# Patient Record
Sex: FEMALE | Race: WHITE
[De-identification: names, ages, dates, MRNs, and addresses within clinical notes are randomized per-mention and may not be internally consistent; named-entity substitution may affect disease eponyms.]

---

## 2020-04-07 ENCOUNTER — HOSPITAL ENCOUNTER (OUTPATIENT)
Dept: HOSPITAL 47 - WWCWWP | Age: 65
Discharge: HOME | End: 2020-04-07
Attending: SURGERY
Payer: COMMERCIAL

## 2020-04-07 VITALS
SYSTOLIC BLOOD PRESSURE: 127 MMHG | DIASTOLIC BLOOD PRESSURE: 81 MMHG | RESPIRATION RATE: 18 BRPM | HEART RATE: 96 BPM | TEMPERATURE: 97.6 F

## 2020-04-07 DIAGNOSIS — Z53.9: Primary | ICD-10-CM

## 2020-04-07 NOTE — P.GSHP
History of Present Illness


H&P Date: 20


Chief Complaint: Abnormal radiograph left breast





     Alisa is a 64-year-old white female who had a routine mamm[place] 

on [address].  This revealed a 7mm left upper outer quadrant lession the patient 

was recommended to have additional imaging performed.  No lesions of concern 

were noted in the right breast.  On [address] she had additional workup which 

revealed a spiculated 1 x 0.9 cm lesion in the left upper outer quadrant 4 cm 

from the nipple.  An ultrasound was performed on the same day which revealed the

same lesion which was considered suspicious and biopsy was recommended.  The 

patietn does not feel anything of concern in her breast. The patients last mamm

ogram was a year ago and she states it was nonworrisome.





Family History:


mother: bilateral breast cancer, cervical cancer, Burkits lymphoma


maternal aunt: breast cancer








Hormonal History:


menarche: 13


, 1 misscarrage, first born at 23, breast fed: no


menoapuse: 51


BCP: 10 years


hormones: none 





Surgical History:


1. tubal ligation








Medical History: 


none





Social History:


smoke: 1 1/2 PPD since 17


alcohol: twice a week/4 beers


drugs: none


























- Constitutional


Constitutional: Reports night sweats





- EENT


Eyes: denies blurred vision, denies pain


Ears: deny: decreased hearing, tinnitus


Ears, nose, mouth and throat: Denies headache, Denies sore throat





- Breasts


Breasts: bilateral: as per HPI





- Cardiovascular


Cardiovascular: Denies chest pain, Denies shortness of breath





- Respiratory


Comment: 





smoker/ bronchitis 


Respiratory: Denies cough, Denies 7





- Gastrointestinal


Gastrointestinal: Denies abdominal pain, Denies diarrhea, Denies nausea, Denies 

vomiting





- Genitourinary (Female)


Genitourinary: Denies dysuria, Denies hematuria





- Menstruation


Menstruation: Reports postmenopausal





- Musculoskeletal


Musculoskeletal: Denies myalgias





- Integumentary


Integumentary: Denies pruritus, Denies rash





- Neurological


Neurological: Denies numbness, Denies weakness





- Psychiatric


Psychiatric: Reports anxiety, Denies depression





- Endocrine


Endocrine: Denies fatigue, Denies weight change





- Hematologic/Lymphatic


Comment: 





none





- Allergic/Immunologic


Comment: 





none


Allergic/Immunologic: Reports as per HPI





Past Medical History


History of Any Multi-Drug Resistant Organisms: None Reported


Smoking Status: Current every day smoker





Medications and Allergies


                                Home Medications











 Medication  Instructions  Recorded  Confirmed  Type


 


ALPRAZolam [Xanax] 0.5 mg PO BID PRN 20 History








                                    Allergies











Allergy/AdvReac Type Severity Reaction Status Date / Time


 


No Known Allergies Allergy   Verified 20 09:50














Surgical - Exam


                                   Vital Signs











Temp Pulse Resp BP Pulse Ox


 


 97.6 F   96   18   127/81   97 


 


 20 09:46  20 09:46  20 09:46  20 09:46  20 09:46














BMI 26.4





- General


well developed, well nourished, no distress





- Eyes


normal ocular movement





- ENT


no hearing loss, no congestion





- Neck


no masses, trachea midline





- Respiratory


normal respiratory effort, clear to auscultation





- Cardiovascular


Rhythm: regular


Heart Sounds: normal: S1, S2





- Abdomen


Abdomen: soft, non tender, no guarding, no rigid, no rebound





- Integumentary





normal turgor





- Neurologic


no disoriented, no combative





- Musculoskeletal


normal gait





- Psychiatric


oriented to time, oriented to person, oriented to place, speech is normal, 

memory intact





breast exam:





Bra: 40B





inspection: no nipple inversion or lesions of concern





Palpation:





right breast:  Multi-positional exam fibrocystic changes, no dominant masses or 

nodules of concern


Right axilla: No adenopathy of concern


left breast:  Multiple positional exam increased fullness in the upper outer 

quadrant.


Left axilla: No adenopathy of concern








Results





Abnormal mammogram left breast, abnormal ultrasound left breast





Assessment and Plan


Assessment: 





Impression:


1.  Abnormal left breast mammogram


2.  Abnormal left breast ultrasound


3.  Palpable mass/fullness left breast


4.  History of tobacco use/bronchitis


5.  Anxiety





Plan:


1.  Ultrasound-guided core biopsy left breast


2.  Follow up after results of ultrasound core biopsy available





Risks and benefits of procedure discussed with the patient.  She understands and

 wishes to proceed.  Risks include but are not limited to bleeding, infection, 

reaction to the anesthetic.  The patient is going to be called next week with 

results of the biopsy.  Secondary to the concerns regarding  corona virus the 

appointment.  She understands that because this is BIRADS 5 if the diagnosis is 

not a cancer she will be scheduled for needle local excisional biopsy of the 

area of concern.  If this is a cancer it appears that there is a T1 lesion and 

she will be scheduled for a teleconference with medical oncology/surgery as soon

 as safe to do so secondary to the coronal virus situation.





CC:


Dr. Prabhu Pat





Encounter 45 minutes, > 50% of time in planning and counselling. 





Time with Patient: Greater than 30

## 2020-04-08 ENCOUNTER — HOSPITAL ENCOUNTER (OUTPATIENT)
Dept: HOSPITAL 47 - RADUSWWP | Age: 65
End: 2020-04-08
Attending: SURGERY
Payer: COMMERCIAL

## 2020-04-08 VITALS — DIASTOLIC BLOOD PRESSURE: 84 MMHG | HEART RATE: 80 BPM | SYSTOLIC BLOOD PRESSURE: 127 MMHG

## 2020-04-08 VITALS — TEMPERATURE: 98 F | RESPIRATION RATE: 16 BRPM

## 2020-04-08 DIAGNOSIS — Z17.0: ICD-10-CM

## 2020-04-08 DIAGNOSIS — C50.912: Primary | ICD-10-CM

## 2020-04-08 PROCEDURE — 77065 DX MAMMO INCL CAD UNI: CPT

## 2020-04-08 PROCEDURE — 88305 TISSUE EXAM BY PATHOLOGIST: CPT

## 2020-04-08 PROCEDURE — 88341 IMHCHEM/IMCYTCHM EA ADD ANTB: CPT

## 2020-04-08 PROCEDURE — 19083 BX BREAST 1ST LESION US IMAG: CPT

## 2020-04-08 PROCEDURE — 88342 IMHCHEM/IMCYTCHM 1ST ANTB: CPT

## 2020-04-08 NOTE — USB
EXAMINATION TYPE: US biopsy breast VAD LT, MG diagnostic mammo LT wo CAD

 

DATE OF EXAM: 4/8/2020

 

CLINICAL HISTORY: R92.8 Abnormal Mammogram. Abnormal ultrasound.

 

TECHNIQUE: Ultrasound guided core biopsy of left breast with clip placement and 
follow-up diagnostic left breast mammogram.  

 

COMPARISON: Mammogram and ultrasound March 20, 2020 and older studies.

 

FINDINGS: The procedure of ultrasound guided core biopsy was explained to the 
patient.  Benefits, alternatives, and risks were discussed.  An informed consent
was then obtained.  

 

The patient was placed in supine positioning for  imaging and for the procedure.
The overlying skin was prepped and draped in usual sterile fashion. Lidocaine 
was used as anesthetic into the skin and subcutaneous tissue up to area of 
concern in the left breast. Lidocaine with epinephrine is used as anesthetic in 
the deeper tissue.

 

Under ultrasound guidance, a vacuum assisted biopsy gun device was used to 
obtain 4 core samples.  Following this, a biopsy clip was left in lesion.  

 

The patient tolerated the procedure well without any immediate complication.  
The patient was kept in the radiology department for short stay after the 
procedure and then discharged home in stable condition.  

 

Post procedure mammogram shows successful deployment of clip corresponding to 
area of concern on mammogram anterior depth upper outer aspect spiculated area.

 

IMPRESSION: Successful, uncomplicated ultrasound guided core biopsy of area of 
concern in the left breast, full pathology results to follow. 

 

Intermediate to high index of suspicion noted at time of procedure.

 

Pathology Results: Malignant

 

LEFT BREAST LESION AT ONE O'CLOCK, NEEDLE CORE BIOPSY:   Infiltrating moderately
differentiated (Cecelia Grade 2) ductal carcinoma of the breast in a 
background of intermediate grade DCIS with focal necrosis.  See Surgical 
Pathology Cancer Case Summary.



In order to differentiate the lesion as ductal adenocarcinoma,  an appropriately
controlled E-Cadherin stain is examined. The tumor is E-Cadherin postive, 
confirming ductal differentiation. 



Recommendation

Surgical consult of the left breast.

CAL[facility]

## 2020-04-21 ENCOUNTER — HOSPITAL ENCOUNTER (OUTPATIENT)
Dept: HOSPITAL 47 - WWCWWP | Age: 65
Discharge: HOME | End: 2020-04-21
Attending: SURGERY
Payer: COMMERCIAL

## 2020-04-21 DIAGNOSIS — Z53.9: Primary | ICD-10-CM

## 2020-04-21 NOTE — P.PN
Progress Note - Text


Progress Note Date: 04/21/20





teleconference regarding core breast biopsy results, stage 1A left breast cancer





Alisa is a 64 -year-old white female who on a routine mammogram was noted to 

have an area of concern in the left breast.  Core biopsy revealed a grade 2 T1 

N0 ER positive PA negative G2 HER-2 negative stage IA invasive ductal carcinoma 

with ductal carcinoma in situ.  The patient post procedure does not have any 

complaints related to the procedure.  I have discussed the surgical options with

the patient which range from lumpectomy sentinel node biopsy to possible 

axillary node dissection, or mastectomy.  The patient has a positive family 

history of breast cancer in her mother having had bilateral breast cancers and a

maternal aunt with breast cancer.  She is requesting genetic testing.  





     The patient has a present appointment with medical oncology May 7.  In the 

pericolic area she would be considered a good candidate for surgical 

intervention.  However in the colon with pan pandemic area she is considered an 

[employer] B1 patient and based on her resources she may 

undergo neoadjuvant chemotherapy.  Additionally if it's possible to get an 

Oncotype test performed she may benefit from some neoadjuvant chemotherapy 

depending on the results.





     Her case will be presented at tumor Board.





Impression/Plan:


1.  Stage I a left breast cancer


2.  Family history of breast cancer


3.  [employer] priority B1, consider neoadjuvant 

hormonal therapy and/or neoadjuvant chemotherapy depending on Oncotype DX score


4.  Presentation at tumor board


5.  Appointment here following restrictions being listed in our clinic to see 

patient secondary to the cold pandemic





CC: Dr. Prabhu Paniagua





encounter 45 minutes

## 2020-05-14 ENCOUNTER — HOSPITAL ENCOUNTER (OUTPATIENT)
Dept: HOSPITAL 47 - WWCWWP | Age: 65
Discharge: HOME | End: 2020-05-14
Attending: SURGERY
Payer: COMMERCIAL

## 2020-05-14 VITALS
DIASTOLIC BLOOD PRESSURE: 94 MMHG | HEART RATE: 86 BPM | RESPIRATION RATE: 18 BRPM | SYSTOLIC BLOOD PRESSURE: 135 MMHG | TEMPERATURE: 97.7 F

## 2020-05-14 DIAGNOSIS — Z53.9: Primary | ICD-10-CM

## 2020-05-14 NOTE — P.PN
Subjective


Progress Note Date: 20


Principal diagnosis: 





left breast cancer  Stage IB


 Alisa is a 64-year-old white female who had a [place] mammogram [place] on 

[address].  This revealed a 7mm left upper outer quadrant lession the patient was 

recommended to have additional imaging performed.  No lesions of concern were 

noted in the right breast.  On [address] she had additional workup which revealed 

a spiculated 1 x 0.9 cm lesion in the left upper outer quadrant 4 cm from the 

nipple.  An ultrasound was performed on the same day which revealed the same 

lesion which was considered suspicious and biopsy was recommended.  The patient 

does not feel anything of concern in her breast. The patients last mammogram pr

ior to this was a year ago and she states it was nonworrisome.


     She had a core biopsy done on 20, this revealed infiltrating moderately

differentiated ductal carcinoma of the left breast.  This was ER positive LA 

negative grade 2 HER-2 negative.  It is a stage IA.  She was  subsequently seen 

by medical oncology and her case was presented at tumor board.  It was felt that

we should proceed with surgical intervention first and further recommendation to

follow this. She did well with the biopsy.

















Family History:


mother: bilateral breast cancer, cervical cancer, Burkits lymphoma


maternal aunt: breast cancer








Hormonal History:


menarche: 13


, 1 misscarrage, first born at 23, breast fed: no


menoapuse: 51


BCP: 10 years


hormones: none 





Surgical History:


1. tubal ligation








Medical History: 


none





Social History:


smoke: 1 1/2 PPD since 17


alcohol: twice a week/4 beers


drugs: none


























- Constitutional


Constitutional: Reports night sweats





- EENT


Eyes: denies blurred vision, denies pain


Ears: deny: decreased hearing, tinnitus


Ears, nose, mouth and throat: Denies headache, Denies sore throat





- Breasts


Breasts: bilateral: as per HPI





- Cardiovascular


Cardiovascular: Denies chest pain, Denies shortness of breath





- Respiratory


Comment: 





smoker/ bronchitis 


Respiratory: Denies cough, Denies 7





- Gastrointestinal


Gastrointestinal: Denies abdominal pain, Denies diarrhea, Denies nausea, Denies 

vomiting





- Genitourinary (Female)


Genitourinary: Denies dysuria, Denies hematuria





- Menstruation


Menstruation: Reports postmenopausal





- Musculoskeletal


Musculoskeletal: Denies myalgias





- Integumentary


Integumentary: Denies pruritus, Denies rash





- Neurological


Neurological: Denies numbness, Denies weakness





- Psychiatric


Psychiatric: Reports anxiety, Denies depression





- Endocrine


Endocrine: Denies fatigue, Denies weight change





- Hematologic/Lymphatic


Comment: 





none





- Allergic/Immunologic


Comment: 





none


Allergic/Immunologic: Reports as per HPI





Past Medical History


History of Any Multi-Drug Resistant Organisms: None Reported


Smoking Status: Current every day smoker





Medications and Allergies


                                Home Medications











 Medication  Instructions  Recorded  Confirmed  Type


 


ALPRAZolam [Xanax] 0.5 mg PO BID PRN 20 History








                                    Allergies











Allergy/AdvReac Type Severity Reaction Status Date / Time


 


No Known Allergies Allergy   Verified 20 09:50

















Objective





- Vital Signs


Vital signs: 


                                   Vital Signs











Temp  97.7 F   20 10:25


 


Pulse  86   20 10:25


 


Resp  18   20 10:25


 


BP  135/94   20 10:25


 


Pulse Ox  98   20 10:25








                                 Intake & Output











 20





 18:59 06:59 18:59


 


Weight   70.76 kg














- Exam





BMI 27.6





- Constitutional


General appearance: Present: average body habitus





- EENT


Eyes: Present: EOMI


ENT: Present: hearing grossly normal





- Respiratory


Respiratory: bilateral: CTA





- Cardiovascular


Rhythm: regular


Heart sounds: normal: S1, S2





- Integumentary


Integumentary Comment(s): 





no infection or hematoma at biopsy site


Integumentary: Present: normal turgor





- Musculoskeletal


Musculoskeletal: Present: gait normal





- Psychiatric


Psychiatric: Present: A&O x's 3, appropriate affect, intact judgment & insight





- Additional findings


Additional findings: 





left breast site no evidence of infection or hematoma at site





left breast slightly larger than right breast


ptosis grade 3 bilateral








Assessment and Plan


Assessment: 





Impression:





1. left breast stage 1B


2. nicotine dependance








Plan:


1.  Left breast needle localization lumpectomy, probable bronchoplasty tissue 

transfer, sentinel node injection, sentinel node biopsy, possible axillary node 

dissection, probable to be done via a mastopexy incision


2.  Patient to attempt to stop smoking between now and then


3.  Medical clearance from Dr. Clemons








Risks and benefits of the procedure been discussed with the patient.  These 

include but are not limited to bleeding, infection, possible reaction to the 

anesthetic.  She understands that if the margins were to be positive she will 

need to have additional surgery.  She also understands there is a risk of not 

getting the correct spot at the time of lumpectomy.  She understands that she 

could have a mastectomy plus or minus reconstruction but has opted for a lump

ectomy.  She understands the risks of sentinel node biopsy.  She also 

understands the risk of flow axillary node dissection.





Cc: Dr. Clemons





encounter 40 minutes greater than 50% of time on planning and counselling





Time with Patient: Greater than 30

## 2020-05-29 ENCOUNTER — HOSPITAL ENCOUNTER (OUTPATIENT)
Dept: HOSPITAL 47 - LABWHC1 | Age: 65
Discharge: HOME | End: 2020-05-29
Attending: SURGERY
Payer: COMMERCIAL

## 2020-05-29 DIAGNOSIS — Z11.59: Primary | ICD-10-CM

## 2020-06-02 ENCOUNTER — HOSPITAL ENCOUNTER (OUTPATIENT)
Dept: HOSPITAL 47 - OR | Age: 65
Discharge: HOME | End: 2020-06-02
Attending: SURGERY
Payer: COMMERCIAL

## 2020-06-02 VITALS — DIASTOLIC BLOOD PRESSURE: 85 MMHG | SYSTOLIC BLOOD PRESSURE: 144 MMHG | HEART RATE: 100 BPM

## 2020-06-02 VITALS — RESPIRATION RATE: 16 BRPM

## 2020-06-02 VITALS — BODY MASS INDEX: 26.5 KG/M2

## 2020-06-02 VITALS — TEMPERATURE: 97.3 F

## 2020-06-02 DIAGNOSIS — Z81.8: ICD-10-CM

## 2020-06-02 DIAGNOSIS — Z80.7: ICD-10-CM

## 2020-06-02 DIAGNOSIS — N60.12: ICD-10-CM

## 2020-06-02 DIAGNOSIS — Z80.49: ICD-10-CM

## 2020-06-02 DIAGNOSIS — Z17.0: ICD-10-CM

## 2020-06-02 DIAGNOSIS — F41.9: ICD-10-CM

## 2020-06-02 DIAGNOSIS — F17.210: ICD-10-CM

## 2020-06-02 DIAGNOSIS — F40.10: ICD-10-CM

## 2020-06-02 DIAGNOSIS — Z98.51: ICD-10-CM

## 2020-06-02 DIAGNOSIS — Z79.899: ICD-10-CM

## 2020-06-02 DIAGNOSIS — Z80.3: ICD-10-CM

## 2020-06-02 DIAGNOSIS — C50.912: Primary | ICD-10-CM

## 2020-06-02 PROCEDURE — 88307 TISSUE EXAM BY PATHOLOGIST: CPT

## 2020-06-02 PROCEDURE — 88342 IMHCHEM/IMCYTCHM 1ST ANTB: CPT

## 2020-06-02 PROCEDURE — 88341 IMHCHEM/IMCYTCHM EA ADD ANTB: CPT

## 2020-06-02 PROCEDURE — 38525 BIOPSY/REMOVAL LYMPH NODES: CPT

## 2020-06-02 PROCEDURE — 19316 MASTOPEXY: CPT

## 2020-06-02 PROCEDURE — 76098 X-RAY EXAM SURGICAL SPECIMEN: CPT

## 2020-06-02 PROCEDURE — 38792 RA TRACER ID OF SENTINL NODE: CPT

## 2020-06-02 PROCEDURE — 19301 PARTIAL MASTECTOMY: CPT

## 2020-06-02 NOTE — P.NAPBC
NAPBC Queries





- NAPBC Queries


Was patient's case review presented at [facility] tumor board? If no, comment.: Yes


Was patient's pathology reviewed at [facility]? If no, comment.: Yes


Was breast conservation surgery offered? If no, comment.: Yes


Was sentinel node biopsy offered? If no, comment.: Yes


Was diagnosis confirmed by percutaneous core biopsy? If no, comment.: Yes


Is patient mastectomy patient?: No


Was a preop referral to reconstructive surgeon offered?: No


Clinical Stage: 





stage IA

## 2020-06-02 NOTE — P.DS
Providers


Attending physician: 


Karol Lindquist





Primary care physician: 


Tod Clemons








Plan - Discharge Summary


Discharge Rx Participant: Yes


New Discharge Prescriptions: 


No Action


   ALPRAZolam [Xanax] 0.5 mg PO BID PRN


     PRN Reason: Anxiety


Discharge Medication List





ALPRAZolam [Xanax] 0.5 mg PO BID PRN 04/07/20 [History]








Follow up Appointment(s)/Referral(s): 


Karol Lindquist MD [STAFF PHYSICIAN] - 1 Week


Activity/Diet/Wound Care/Special Instructions: 


Do not drive until seen by Dr. Paniagua


Wear bra at all times


May shower after 48 hours


Discharge Disposition: HOME SELF-CARE

## 2020-06-02 NOTE — MM
EXAMINATION TYPE: MG pre op needle loc LT, MG surgical specimen LT

 

DATE OF EXAM: 6/2/2020

 

COMPARISON: Left breast biopsy dated 4/8/2020

 

CLINICAL HISTORY: Biopsy-proven left breast cancer

 

TECHNIQUE: Needle localization with wire placement and surgical excision of area
of concern in the left breast.  

 

FINDINGS: The procedure of needle localization with wire placement and than 
surgical excision was explained to the patient.  Benefits, alternatives, and 
risks were discussed.  An informed consent was then obtained.  Preprocedural 
timeout was performed.

 

The shortest pathway for procedure was chosen.  Shortest pathway was lateral 
medial approach. The overlying skin was prepped and draped in usual sterile 
fashion. 10 cc of 1% lidocaine was used as anesthetic into the skin and 
subcutaneous tissue up to the level of area of concern.  A 5 cm needle was used.
 It was placed via a lateral to medial approach under mammographic guidance.  
Subsequent 90 degrees mammogram show the needle to be in satisfactory position 
relative to the targeted area.  At this point, wire was placed and the needle 
was withdrawn.  The wire was fixed to patient's skin.  Images were marked for 
surgeon.  

 

The patient tolerated the procedure well without any immediate complication.  
The patient was kept in the radiology department for short stay after the 
procedure and then taken to surgery for surgical excision.  Targeted coil-shaped
biopsy marker and wire are identified in specimen mammogram.  The patient was 
kept in hospital for short stay after the procedure and then discharged home in 
stable condition.

 

IMPRESSION: Successful, uncomplicated needle localization with wire placement 
and surgical excision of a targeted coil-shaped biopsy marker denoting the 
biopsy-proven left breast cancer, full pathology results to follow.  

 

Pathology Results: Malignant



A.  SENTINEL LYMPH NODE, BIOPSY:  Five lymph nodes negative for metastasis.  CK7
and EMA immunoperoxidase stains are confirmatory (controls appropriate).



B.  LEFT BREAST, LUMPECTOMY:  Invasive ductal carcinoma (Grade 2) and high grade
DCIS, margins negative for malignancy.  See Surgical Pathology Cancer Case 
Summary.



C.  LEFT BREAST, NEW INFERIOR MARGIN:  Benign breast with fibrocystic changes. 



Recommendation

Oncologic follow up.

[facility]

## 2020-06-02 NOTE — P.OP
Date of Procedure: 06/02/20


Preoperative Diagnosis: 


Left breast invasive ductal carcinoma


Postoperative Diagnosis: 


Same


Procedure(s) Performed: 


Left breast needle localization lumpectomy the crescent mastopexy incision, 

bronchoplasty tissue transfer, sentinel node biopsy


Anesthesia: SOBIAA


Surgeon: Karol Lindquist


Estimated Blood Loss (ml): 25


IV fluids (ml): 1,300


Pathology: other (Breast tissue, sentinel node)


Condition: stable


Disposition: same day


Indications for Procedure: 


Left breast invasive ductal carcinoma


Operative Findings: 


Fibrofatty breast tissue


Description of Procedure: 


Alisa is a 64-year-old white female who was diagnosed with a left breast 

invasive ductal carcinoma.  The patient underwent localization of the area of 

concern in the left breast as well as left recurrent injection for the sentinel 

node identification.  The patient was marked With a marking pen in the 

preoperative area.  A crescent mastopexy was performed utilizing this incision 

to do the resection.  Following this she was brought to the operating room where

the left breast was prepped and draped in a sterile fashion as well as the left 

axilla following induction of general anesthesia.  The axilla was approached 

initially.  A appropriate greatest radioactivity in the axilla was identified.  

An incision was made and carried through the skin and subcutaneous tissue down 

to the axillary tissue.  The area of the axilla was approached and the deep 

axillary tissue a radioactive lymph node was identified.  This was removed using

the Harmonic scalpel and electrocautery device.  The 10 second count of the 

sentinel node was 2874.  The background count was 12.  Following this the 

axillary tissues were irrigated.  After assured that hemostasis was attained the

deep tissues were closed using 3-0 Vicryl suture.  The skin was closed using 3-0

Vicryl stick suture subcutaneous.  The patient had a 4-0 Monocryl subcuticular 

suture placed.  The breast was then approached.  A crescent skin incision 

superior to the area where it was performed.  The apex of the crescent was 

approximately 2 cm above the ileal loop.  The skin was de-epithelialized.  The 

breast parenchyma was then entered at the superior margin of the crescent.  It 

was dissected down to the area of the shaft of the needle.  Surrounding tissue 

was excised being careful to maintain hemostasis using the electrocautery device

and the Harmonic scalpel.  Dissection was performed posteriorly down to the 

pectoralis muscle.  Inferiorly additional tissue was obtained.  The tissue was 

painted for orientation.





Radiograph of the specimen revealed the area of concern was removed.  Titanium 

clips were placed in the cavity.  The tissues laterally and medially were then 

mobilized.  Mobilization was approximately 6 cm x 4 cm both medially and 

laterally, for a total of 48 cm of tissue mobilized  The tissue was then brought

into the defect.  The tissue was secured in place using 3-0 Vicryl suture.  This

was followed by closure of the mastopexy incision resulting in a lift of the 

nipple areolar complex buy about 2 cm, using 3-0 subcutaneous Vicryl suture 

followed by a 4-0 Monocryl suture  in the subcuticular tissue.  A nylon suture 

was then placed in the skin.  The patient tolerated the procedure in stable 

condition.  All instrument and sponge counts were correct at the end of the 

case.

## 2020-06-02 NOTE — NM
EXAMINATION TYPE: NM sentinel node injection

 

DATE OF EXAM: 6/2/2020

 

COMPARISON: Ultrasound-guided left breast biopsy dated 4/8/2020 and postprocedural mammogram

 

HISTORY: Biopsy-proven left breast cancer

 

TECHNIQUE AND FINDINGS: The procedure of sentinel lymph node injection was explained to the patient. 
 The benefits, alternatives, and risks were discussed.  An informed consent was then obtained.  Prepr
ocedural timeout was performed.

 

Overlying skin is cleaned with sterile alcohol.  Following this, 530 uCi Tc99m Tilmanocept was inject
ed in the upper outer aspect of the left nipple intradermally.  

 

The patient tolerated the procedure well without any immediate complication.  The patient was kept in
 the radiology department for short stay after the procedure and then taken to surgery for surgical p
rocedure what is presumed intraoperative gamma probe will be used for sentinel lymph node detection. 
 

 

IMPRESSION:  Left breast radiotracer injection for sentinel node localization as above.

## 2020-06-11 ENCOUNTER — HOSPITAL ENCOUNTER (OUTPATIENT)
Dept: HOSPITAL 47 - WWCWWP | Age: 65
Discharge: HOME | End: 2020-06-11
Attending: SURGERY
Payer: COMMERCIAL

## 2020-06-11 VITALS
HEART RATE: 102 BPM | RESPIRATION RATE: 16 BRPM | SYSTOLIC BLOOD PRESSURE: 114 MMHG | TEMPERATURE: 98.2 F | DIASTOLIC BLOOD PRESSURE: 79 MMHG

## 2020-06-11 DIAGNOSIS — Z53.9: Primary | ICD-10-CM

## 2020-06-11 NOTE — P.PN
Subjective


Progress Note Date: 06/11/20


Principal diagnosis: 





Stage 1B left breast cancer





Patient is a 64-year-old white female status post left breast lumpectomy and 

sentinel node biopsy on 6to20.  Pathology revealed invasive ductal carcinoma 

grade 2 and high-grade DCIS, margins were negative for malignancy.  5 sentinel 

nodes were removed and all were negative for malignancy.  She has been seen by 

medical and radiation oncology.  An Oncotype DX test is being performed.  She 

has been recommended to undergo radiation therapy.  The size of the invasive 

carcinoma was 12 mm, the DCIS was 20 mm.  All margins were negative although the

DCIS was less than 1 mm from the lateral margin.  She has no complaints related 

to the surgery.





Objective





- Vital Signs


Vital signs: 


                                 Intake & Output











 06/10/20 06/11/20 06/11/20





 18:59 06:59 18:59


 


Weight   71.668 kg














- Exam





BMI 28





- Constitutional


General appearance: Present: average body habitus





- EENT


Eyes: Present: EOMI


ENT: Present: hearing grossly normal





- Neck


Neck: Present: normal ROM





- Respiratory


Details: 





mild crackles at the lung bases





- Cardiovascular


Rhythm: regular


Heart sounds: normal: S1, S2





- Integumentary


Integumentary Comment(s): 





incision clean and dry








- Musculoskeletal


Musculoskeletal: Present: gait normal





- Psychiatric


Psychiatric: Present: A&O x's 3, appropriate affect, intact judgment & insight





- Additional findings


Additional findings: 





Breast examination:


Right breast larger than left breast


Left breast incision clean and dry no evidence of infection





Assessment and Plan


Assessment: 





Impression:


1. left breast stage 1B


2.  Status post left breast lumpectomy and sentinel node biopsy margins 

negative/nodes negative


3.  Patient seen by medical oncology Oncotype DX being performed


4.  Radiation therapy appointment next month


5.  Asymmetry of the breast will consider procedure for this in the future





Plan:


1.  Follow up here in 3 months


2.  Suture removal


3.  Await Oncotype DX results


4.  Follow up with radiation oncology





CC: Dr. Clemons

## 2020-10-09 ENCOUNTER — HOSPITAL ENCOUNTER (OUTPATIENT)
Dept: HOSPITAL 47 - WWCWWP | Age: 65
Discharge: HOME | End: 2020-10-09
Attending: SURGERY
Payer: COMMERCIAL

## 2020-10-09 VITALS
DIASTOLIC BLOOD PRESSURE: 71 MMHG | HEART RATE: 110 BPM | RESPIRATION RATE: 12 BRPM | TEMPERATURE: 97.7 F | SYSTOLIC BLOOD PRESSURE: 104 MMHG

## 2020-10-09 DIAGNOSIS — Z53.9: Primary | ICD-10-CM

## 2020-10-09 NOTE — P.PN
Subjective


Progress Note Date: 10/09/20


Principal diagnosis: 





stage 1B left breast cancer


Patient is a 64-year-old white female status post left breast lumpectomy and 

sentinel node biopsy on .  Pathology revealed invasive ductal carcinoma 

grade 2 and high-grade DCIS, margins were negative for malignancy.  5 sentinel 

nodes were removed and all were negative for malignancy.  The size of the 

invasive carcinoma was 12 mm, the DCIS was 20 mm. All margins were negative 

although the DCIS was less than 1 mm from the lateral margin.  She has no 

complaints related to the surgery.


She has been seen by medical and radiation oncology.  An Oncotype DX test was  

performed, and 45% .  She had TC x4.    She has been recommended to undergo 

radiation therapy.  She will start this next week.    


     She will be placed on an anti-hormone medication after she completes her 

radiation therapy.  Family history:


Mother: Bilateral breast cancer, cervical cancer, Parkinson's lymphoma


Maternal aunt: Breast cancer





Hormonal history:


Menarche: 13


, 1 miscarriage, first , breast fed: No


Menopause: 51








Intraoperative was: 10 years


Hormones: Negative





Surgical history:


1.  Tubal ligation


2.  Left breast lumpectomy sentinel node biopsy





Medical history:


Depression





Social history:


Smoke: 1-1/2 packs per day since 17


Alcohol: Twice a week, 4 beers


Drugs: Negative





Review of systems:


Constitutional: Night sweats


HEENT: Negative


Breasts: As per HPI


Cardiovascular: Negative


Gastric: Smoker/bronchitis


GI: Negative


: Negative


Musculoskeletal: Negative


Integument: Negative


Neurologic: Negative


Psychiatric: Anxiety/deep depression


Endocrine: Negative


Hematologic: Negative


ALLERGIES: Negative











Objective





- Vital Signs


Vital signs: 


                                   Vital Signs











Temp  97.7 F   10/09/20 10:47


 


Pulse  110 H  10/09/20 10:47


 


Resp  12   10/09/20 10:47


 


BP  104/71   10/09/20 10:47


 


Pulse Ox  99   10/09/20 10:47








                                 Intake & Output











 10/08/20 10/09/20 10/09/20





 18:59 06:59 18:59


 


Weight   74.389 kg














- Exam





BMI 29.1





- Constitutional


General appearance: Present: average body habitus





- EENT


Eyes: Present: EOMI


ENT: Present: hearing grossly normal





- Neck


Neck: Present: normal ROM





- Respiratory


Respiratory: bilateral: CTA





- Cardiovascular


Rhythm: regular


Heart sounds: normal: S1, S2





- Gastrointestinal


General gastrointestinal: Present: normal bowel sounds, soft





- Integumentary


Integumentary: Present: normal turgor





- Musculoskeletal


Musculoskeletal: Present: gait normal





- Psychiatric


Psychiatric: Present: A&O x's 3, appropriate affect, intact judgment & insight





- Additional findings


Additional findings: 





breast exam:


BRA: 40C


inspection: bilateral grade 2/3 ptosis, well healed scar left breast


Palpation:


Right breast: Multi-positional exam no dominant masses or nodules of concern 

fibrocystic changes


Right axilla: No adenopathy of concern


Left breast: Multi-positional exam well-healed scar no dominant masses or 

nodules of concern, postoperative changes, fibrocystic changes


Left axilla: No adenopathy of concern





Assessment and Plan


Assessment: 





Impression:


1.  Patient status post left breast lumpectomy and sentinel node biopsy stage IB

left breast cancer


2.  Patient has completed a course of chemotherapy, TC 4


3.  Patient is getting ready to start radiation therapy


4.  Patient will have formed  Hormonal therapy following the radiation therapy


5.  Anxiety/depression patient on Olanzapine





Plan:


1.  Follow-up in 3 months for physician exam


2.  Radiation therapy


3.  Patient to call if any questions or concerns


CC: Dr. Clemons





encounter 15 minutes, > 50% of time in planning and counselling

## 2021-01-05 ENCOUNTER — HOSPITAL ENCOUNTER (OUTPATIENT)
Dept: HOSPITAL 47 - RADBDWWP | Age: 66
Discharge: HOME | End: 2021-01-05
Attending: INTERNAL MEDICINE
Payer: COMMERCIAL

## 2021-01-05 DIAGNOSIS — Z92.21: ICD-10-CM

## 2021-01-05 DIAGNOSIS — Z79.890: ICD-10-CM

## 2021-01-05 DIAGNOSIS — C50.412: Primary | ICD-10-CM

## 2021-01-05 PROCEDURE — 77080 DXA BONE DENSITY AXIAL: CPT

## 2021-01-05 NOTE — BD
EXAMINATION TYPE: Axial Bone Density

 

DATE OF EXAM: 1/5/2021

 

COMPARISON: 09.23.2011

 

CLINICAL HISTORY: 65 YR OLD FEMALE....ICD-10 CODE:   Z79.890 POST MENOPAUSAL

 

Height:  62

Weight:  148

 

FRAX RISK QUESTIONS:

Current Tobacco Use: YES

 

RISK FACTORS 

HISTORY OF: 

HX OF LT ELBOW FX....AS A TEEN

Postmenopausal woman:  YES AT AGE 51

Take estrogen and/or progesterone medications: NO AND IS ON ANASTROZOLE 

Hyperparathyroidism: NO

Adrenal Insufficiency: NO

 

MEDICATIONS: 

Additional Medications: XANAX, HX OF CHEMO AND RADIATION, LT BREAST CA, ANASTROZOLE   

Additional History: LT BREAST CA 

 

EXAM MEASUREMENTS: 

Bone mineral densitometry was performed using the [employer] System.

Bone mineral density as measured about the Lumbar spine is:  

----- L1-L4(G/cm2): 1.255

T Score Values are as follows:

----- L1:  0.4

----- L2:  0.4

----- L3:  0.9

----- L4:  0.7

----- L1-L4:  0.6

Bone mineral density has: Increased 10.4% since study of: 09.23.2011

 

Bone mineral density about the R hip (g/cm2): 0.950

Bone mineral density about the L hip (g/cm2):  1.004

T Score values are as follows:

-----R Neck: -0.9

-----L Neck:  -0.9

-----R Total:  -0.5

-----L Total:  0.0

Bone mineral density has: Decreased -4.1% since study of: 09.23.2011

 

FRAX%s:   THERE IS A 7.6% CHANCE FOR A MAJOR OSTEOPOROTIC FX AND A 0.8% FOR HIP....PROBABILITY FOR FX
 IN 10 YRS TIME

 

IMPRESSION:

Normal (Values between +1 and -1 indicate normal bone mass).  Consider repeating this study in 5 year
s or sooner if there is some new clinical indication.

 

NOTE:  T-SCORE=SD OF THE YOUNG ADULT MEAN.

## 2021-01-08 ENCOUNTER — HOSPITAL ENCOUNTER (OUTPATIENT)
Dept: HOSPITAL 47 - WWCWWP | Age: 66
Discharge: HOME | End: 2021-01-08
Attending: SURGERY
Payer: COMMERCIAL

## 2021-01-08 VITALS
RESPIRATION RATE: 18 BRPM | HEART RATE: 78 BPM | TEMPERATURE: 97.5 F | DIASTOLIC BLOOD PRESSURE: 89 MMHG | SYSTOLIC BLOOD PRESSURE: 137 MMHG

## 2021-01-08 DIAGNOSIS — Z53.9: Primary | ICD-10-CM

## 2021-01-08 NOTE — P.PN
Subjective


Progress Note Date: 21


Principal diagnosis: 





stage IB left breast cancer


stage 1B left breast cancer


Patient is a 65-year-old white female status post left breast lumpectomy and 

sentinel node biopsy on .  Pathology revealed invasive ductal carcinoma 

grade 2 and high-grade DCIS, margins were negative for malignancy.  5 sentinel 

nodes were removed and all were negative for malignancy.  The size of the 

invasive carcinoma was 12 mm, the DCIS was 20 mm. All margins were negative 

although the DCIS was less than 1 mm from the lateral margin.  She has no 

complaints related to the surgery.


She has been seen by medical and radiation oncology.  An Oncotype DX test was  

performed, and 45% .  She had TC x4.    She completed radiation therapy in 

2020.     


     She is taking any hormone therapy and tolerating this without difficulty.


 


 Family history:


Mother: Bilateral breast cancer, cervical cancer, Parkinson's disease, lymphoma


Maternal aunt: Breast cancer





Hormonal history:


Menarche: 13


, 1 miscarriage, first child born at 23, breast fed: No


Menopause: 51


Hormones: Negative





Surgical history:


1.  Tubal ligation


2.  Left breast lumpectomy sentinel node biopsy





Medical history:


Depression





Social history:


Smoke: 1-1/2 packs per day since 17


Alcohol: Twice a week, 4 beers


Drugs: Negative





Review of systems:


Constitutional: Night sweats


HEENT: Negative


Breasts: As per HPI


Cardiovascular: Negative


Gastric: Smoker/bronchitis


GI: Negative


: Negative


Musculoskeletal: Negative


Integument: Negative


Neurologic: Negative


Psychiatric: Anxiety/deep depression


Endocrine: Negative


Hematologic: Negative


ALLERGIES: Negative














Objective





- Vital Signs


Vital signs: 


                                   Vital Signs











Temp  97.5 F L  21 08:50


 


Pulse  78   21 08:50


 


Resp  18   21 08:50


 


BP  137/89   21 08:50


 


Pulse Ox  99   21 08:50








                                 Intake & Output











 21





 18:59 06:59 18:59


 


Weight   67.132 kg














- Exam





BMI 27.1





- Constitutional


General appearance: Present: average body habitus





- EENT


Eyes: Present: EOMI


ENT: Present: hearing grossly normal





- Neck


Neck: Present: normal ROM





- Respiratory


Respiratory: bilateral: CTA





- Cardiovascular


Rhythm: regular


Heart sounds: normal: S1, S2





- Gastrointestinal


General gastrointestinal: Present: normal bowel sounds, soft





- Integumentary


Integumentary: Present: normal turgor





- Musculoskeletal


Musculoskeletal: Present: gait normal





- Psychiatric


Psychiatric: Present: A&O x's 3, appropriate affect





- Additional findings


Additional findings: 





Breast exam:


sports bra: 40


inspection: post-op changes left breast


palpation:


Right breast: Multi-positional exam fibrocystic changes, no dominant masses or 

nodules of concern


Right axilla: No adenopathy of concern


Left breast: Postop changes, no dominant masses or nodules of concern,  no 

evidence of recurrent cancer


Left axilla: No adenopathy of concern





Assessment and Plan


Assessment: 





Impression:


1.  Stage I left breast cancer status post lumpectomy sentinel node biopsy, 

radiation therapy, and chemotherapy, she is presently on antiestrogen medication


2.  No evidence of recurrent cancer


2.  Depression under control








Plan:


1.  Patient is due for bilateral mammogram in 2021 this will be done prior

to next appointment


2.  Follow-up in March after a bilateral mammogram; last one done on 3-20-20


3.  Continue present antiestrogen therapy


4.  Continue follow-up with medical and radiation oncology





Cc: Dr. Clemons





encounter 15 minutes, > 50% of time in planning and counselling

## 2021-03-23 ENCOUNTER — HOSPITAL ENCOUNTER (OUTPATIENT)
Dept: HOSPITAL 47 - RADMAMWWP | Age: 66
End: 2021-03-23
Attending: SURGERY
Payer: COMMERCIAL

## 2021-03-23 DIAGNOSIS — N64.89: ICD-10-CM

## 2021-03-23 DIAGNOSIS — Z85.3: Primary | ICD-10-CM

## 2021-03-23 DIAGNOSIS — Z80.3: ICD-10-CM

## 2021-03-23 DIAGNOSIS — Z78.0: ICD-10-CM

## 2021-03-23 PROCEDURE — 77066 DX MAMMO INCL CAD BI: CPT

## 2021-03-23 NOTE — MM
Reason for exam: additional evaluation requested from prior study.

Last mammogram was performed 11 months ago.



History:

Patient is postmenopausal and has history of breast cancer at age 64.

Family history of breast cancer in maternal aunt and premenopausal breast cancer 

in mother at age 32.

Malignant MG pre op needle loc LT of the left breast, June 2, 2020.  Lumpectomy of

the left breast, June 2, 2020.  Malignant US biopsy breast VAD LT of the left 

breast, April 8, 2020.  Benign US breast aspiration single RT of the right breast,

February 15, 2016.  Benign left mammotome panel of the left breast, January 12, 2007.



Physical Findings:

Nurse did not find any significant physical abnormalities on exam.



MG Diagnostic Mammo w CAD DIONNA

Bilateral CC and MLO view(s) were taken.

Prior study comparison: April 8, 2020, left breast MG diagnostic mammo LT wo CAD. 

March 20, 2020, bilateral MG work up mamm w CAD BILAT.  March 7, 2019, bilateral 

MG screening mammo w CAD.  February 16, 2018, bilateral MG screening mammo w CAD. 

February 15, 2017, bilateral MG screening mammo w CAD.

Asymmetric breast tissue in left breast likely post operative.



These results were verbally communicated with the patient and result sheet given 

to the patient on 3/23/21.





ASSESSMENT: Probably benign, BI-RAD 3



RECOMMENDATION:

Follow-up diagnostic mammogram of the left breast in 6 months.

## 2021-04-01 ENCOUNTER — HOSPITAL ENCOUNTER (OUTPATIENT)
Dept: HOSPITAL 47 - WWCWWP | Age: 66
End: 2021-04-01
Attending: SURGERY
Payer: COMMERCIAL

## 2021-04-01 VITALS
SYSTOLIC BLOOD PRESSURE: 160 MMHG | HEART RATE: 78 BPM | TEMPERATURE: 97.6 F | DIASTOLIC BLOOD PRESSURE: 93 MMHG | RESPIRATION RATE: 18 BRPM

## 2021-04-01 DIAGNOSIS — Z80.3: ICD-10-CM

## 2021-04-01 DIAGNOSIS — N61.0: Primary | ICD-10-CM

## 2021-04-01 DIAGNOSIS — F17.210: ICD-10-CM

## 2021-04-01 DIAGNOSIS — Z98.890: ICD-10-CM

## 2021-04-01 DIAGNOSIS — F32.9: ICD-10-CM

## 2021-04-01 DIAGNOSIS — Z90.12: ICD-10-CM

## 2021-04-01 DIAGNOSIS — Z85.3: ICD-10-CM

## 2021-04-01 NOTE — P.PN
Subjective


Progress Note Date: 21


Principal diagnosis: 





stage IA left breast cancer


stage IB left breast cancer


stage 1B left breast cancer


Patient is a 65-year-old white female status post left breast lumpectomy and 

sentinel node biopsy on .  Pathology revealed invasive ductal carcinoma 

grade 2 and high-grade DCIS, margins were negative for malignancy.  5 sentinel 

nodes were removed and all were negative for malignancy.  The size of the 

invasive carcinoma was 12 mm, the DCIS was 20 mm. All margins were negative 

although the DCIS was less than 1 mm from the lateral margin.  She has no 

complaints related to the surgery.


She has been seen by medical and radiation oncology.  An Oncotype DX test was  

performed, and 45% .  She had TC x4.    She completed radiation therapy in 

2020.     


     She is taking antihormone therapy and tolerating this without difficulty. 

She is taking arimidex. She had a bilateral mammogram on 3-23-21 and was 

recommended to have a repeat left breast mammogram in 6 months.  No lesions of 

concern were noted in the right breast.  The patient is not complaining of any 

lumps masses or nodules of concern in either breast. 


 


 Family history:


Mother: Bilateral breast cancer, cervical cancer, Parkinson's disease, lymphoma


Maternal aunt: Breast cancer





Hormonal history:


Menarche: 13


, 1 miscarriage, first child born at 23, breast fed: No


Menopause: 51


Hormones: Negative





Surgical history:


1.  Tubal ligation


2.  Left breast lumpectomy sentinel node biopsy





Medical history:


anxiety





Social history:


Smoke: 1-1/2 packs per day since 17


Alcohol: Twice a week, 4 beers


Drugs: Negative





Review of systems:


Constitutional: Night sweats


HEENT: Negative


Breasts: As per HPI


Cardiovascular: Negative


Gastric: Smoker/bronchitis


GI: Negative


: Negative


Musculoskeletal: Negative


Integument: Negative


Neurologic: Negative


Psychiatric: Anxiety/deep depression


Endocrine: Negative


Hematologic: Negative


ALLERGIES: Negative

















Objective





- Vital Signs


Vital signs: 


                                   Vital Signs











Temp  97.6 F   21 08:36


 


Pulse  78   21 08:36


 


Resp  18   21 08:36


 


BP  160/93   21 08:36


 


Pulse Ox  99   21 08:36








                                 Intake & Output











 21





 18:59 06:59 18:59


 


Weight   68.039 kg














- Exam





BMI 27.4





- Constitutional


General appearance: Present: average body habitus





- EENT


Eyes: Present: EOMI


ENT: Present: hearing grossly normal





- Neck


Neck: Present: normal ROM





- Respiratory


Respiratory: bilateral: CTA





- Cardiovascular


Rhythm: regular


Heart sounds: normal: S1, S2





- Gastrointestinal


General gastrointestinal: Present: soft





- Integumentary


Integumentary: Present: normal turgor





- Musculoskeletal


Musculoskeletal: Present: gait normal





- Psychiatric


Psychiatric: Present: A&O x's 3, appropriate affect, intact judgment & insight





- Additional findings


Additional findings: 





Breast exam:


BRA: 40C


inspection: Well-healed scar left breast from prior surgery


Palpation:


Right breast: Multiple positional exam fibrocystic changes, mild asymmetry of 

the nipple areolar location related to her left breast surgery, no dominant 

masses or nodules was of concern


Right axilla: No adenopathy of concern


Left breast: Multiple positional exam fibrocystic changes, well-healed scar from

prior surgery, no new dominant masses or nodules of concern


Left axilla: No adenopathy of concern





Fungal infection under both breasts greater on the left side








Assessment and Plan


Assessment: 





Impression:


1.  Patient status post left breast lumpectomy and sentinel node biopsy no e

vidence of recurrent cancer


2.  Recent bilateral mammogram repeat left breast mammogram in 6 months


3.  Fungal infection under both breasts with this patient prescription for 

nystatin


4.  Patient continues on of limited access


5.  Note from medical oncology reviewed from [id number] we'll continue to follow 

with medical oncology


6.  Continue to follow with radiation oncology





Plan:


1.  Follow-up left breast mammogram in 6 months with physician exam here at that

time


2.  Bilateral mammogram in 1 year


3.  Continue her Arimidex


4.  Nystatin for fungal infection under the breast


5.  follow up sooner if any concerns


Cc: Dr. Clemons





Diagnoses:


1.  Stage IA left breast invasive ductal carcinoma/stable


2.  Bilateral mammogram with recommendation of repeat left breast mammogram in 6

months


3.  Bilateral fungal infection under the breast





External note from Dr. Prajapati reviewed


Review of mammogram 3-23-21


Ordering of repeat left breast mammogram in 6 months





Patient ordered nystatin for treatment of fungal infection under the breast


Patient will follow up here in 6 months for repeat left breast evaluation

## 2021-09-29 ENCOUNTER — HOSPITAL ENCOUNTER (OUTPATIENT)
Dept: HOSPITAL 47 - RADMAMWWP | Age: 66
Discharge: HOME | End: 2021-09-29
Attending: SURGERY
Payer: SELF-PAY

## 2021-09-29 DIAGNOSIS — Z85.3: ICD-10-CM

## 2021-09-29 DIAGNOSIS — Z80.3: ICD-10-CM

## 2021-09-29 DIAGNOSIS — Z78.0: ICD-10-CM

## 2021-09-29 DIAGNOSIS — R92.2: Primary | ICD-10-CM

## 2021-09-29 PROCEDURE — 77065 DX MAMMO INCL CAD UNI: CPT

## 2021-09-29 PROCEDURE — 77061 BREAST TOMOSYNTHESIS UNI: CPT

## 2021-09-29 NOTE — MM
Reason for exam: follow-up at short interval from prior study.

Last mammogram was performed 6 months ago.



History:

Patient is postmenopausal and has history of breast cancer at age 64.

Family history of breast cancer in maternal aunt and premenopausal breast cancer 

in mother at age 32.

Malignant MG pre op needle loc LT of the left breast, June 2, 2020.  Lumpectomy of

the left breast, June 2, 2020.  Malignant US biopsy breast VAD LT of the left 

breast, April 8, 2020.  Benign US breast aspiration single RT of the right breast,

February 15, 2016.  Benign left mammotome panel of the left breast, January 12, 2007.

Taking antineoplastic for 1 year beginning at age 65.



Physical Findings:

Nurse did not find any significant physical abnormalities on exam.



MG 3D Diag Mammo W/Cad LT

CC and MLO view(s) were taken of the left breast.

Prior study comparison: March 23, 2021, bilateral MG diagnostic mammo w CAD DIONNA.  

April 8, 2020, left breast MG diagnostic mammo LT wo CAD.

The breast tissue is heterogeneously dense. This may lower the sensitivity of 

mammography.  Post operative changes of lumpectomy and radiation therapy left 

breast.



These results were verbally communicated with the patient and result sheet given 

to the patient on 9/29/21.





ASSESSMENT: Benign, BI-RAD 2



RECOMMENDATION:

Follow-up diagnostic mammogram of both breasts in 6 months.

## 2021-11-12 ENCOUNTER — HOSPITAL ENCOUNTER (OUTPATIENT)
Dept: HOSPITAL 47 - WWCWWP | Age: 66
End: 2021-11-12
Attending: SURGERY
Payer: MEDICARE

## 2021-11-12 VITALS
RESPIRATION RATE: 18 BRPM | SYSTOLIC BLOOD PRESSURE: 158 MMHG | HEART RATE: 91 BPM | DIASTOLIC BLOOD PRESSURE: 87 MMHG | TEMPERATURE: 97.9 F

## 2021-11-12 DIAGNOSIS — F17.210: ICD-10-CM

## 2021-11-12 DIAGNOSIS — Z79.811: ICD-10-CM

## 2021-11-12 DIAGNOSIS — Z92.3: ICD-10-CM

## 2021-11-12 DIAGNOSIS — Z92.21: ICD-10-CM

## 2021-11-12 DIAGNOSIS — F41.9: ICD-10-CM

## 2021-11-12 DIAGNOSIS — Z80.3: ICD-10-CM

## 2021-11-12 DIAGNOSIS — N60.12: Primary | ICD-10-CM

## 2021-11-12 NOTE — P.PN
Subjective


Progress Note Date: 21


Principal diagnosis: 





left breast stage 1 invasive ductal cancer


stage I left breast cancer [id number]-


Patient is a 65-year-old white female status post left breast lumpectomy and 

sentinel node biopsy on .  Pathology revealed invasive ductal carcinoma 

grade 2 and high-grade DCIS, margins were negative for malignancy.  5 sentinel 

nodes were removed and all were negative for malignancy.  The size of the 

invasive carcinoma was 12 mm, the DCIS was 20 mm. All margins were negative 

although the DCIS was less than 1 mm from the lateral margin.  She has no 

complaints related to the surgery.


She has been seen by medical and radiation oncology.  An Oncotype DX test was  

performed, and 45% .  She had TC x4.    She completed radiation therapy in 

2020.     


     She is taking antihormone therapy and tolerating this without difficulty. 

She is taking arimidex. She had a bilateral mammogram on 3-23-21 and was 

recommended to have a repeat left breast mammogram in 6 months.  No lesions of 

concern were noted in the right breast. 





A repeat left breast mammogram was preformed on 21 which was benign BIRAD 2

and repeat bilateral mammogram in 6 months recommended. 


At this time the patient is not complaining of any lumps masses or nodules in 

either breast.  She is not complaining of any nipple discharge or skin changes.


Note from [id number] of Dr. Prajapati are reviewed.  She will be seen by him again in

2 weeks. 


 


 Family history:


Mother: Bilateral breast cancer, cervical cancer, Parkinson's disease, lymphoma


Maternal aunt: Breast cancer





Hormonal history:


Menarche: 13


, 1 miscarriage, first child born at 23, breast fed: No


Menopause: 51


Hormones: Negative





Surgical history:


1.  Tubal ligation


2.  Left breast lumpectomy sentinel node biopsy





Medical history:


anxiety





Social history:


Smoke: 1-1/2 packs per day since 17


Alcohol: Twice a week, 4 beers


Drugs: Negative





Review of systems:


Constitutional: Night sweats


HEENT: Negative


Breasts: As per HPI


Cardiovascular: Negative


Gastric: Smoker/bronchitis


GI: Negative


: Negative


Musculoskeletal: Negative


Integument: Negative


Neurologic: Negative


Psychiatric: Anxiety/deep depression


Endocrine: Negative


Hematologic: Negative


ALLERGIES: Negative

















Objective





- Vital Signs


Vital signs: 


                                   Vital Signs











Temp  97.9 F   21 08:54


 


Pulse  91   21 08:54


 


Resp  18   21 08:54


 


BP  158/87   21 08:54


 


Pulse Ox  96   21 08:54








                                 Intake & Output











 21





 18:59 06:59 18:59


 


Weight   65.771 kg














- Constitutional


General appearance: Present: cooperative





- EENT


Eyes: Present: EOMI


ENT: Present: hearing grossly normal





- Neck


Neck: Present: normal ROM





- Respiratory


Respiratory: bilateral: CTA





- Cardiovascular


Rhythm: regular


Heart sounds: normal: S1, S2





- Integumentary


Integumentary: Present: normal turgor





- Musculoskeletal


Musculoskeletal: Present: gait normal





- Psychiatric


Psychiatric: Present: A&O x's 3, appropriate affect, intact judgment & insight





- Additional findings


Additional findings: 





Breast Exam:


BRA: 40C


 inspection: Left breast slightly smaller than right breast after treatment


Outpatient:


Right breast: Multi-positional exam fibrocystic changes no dominant masses or 

nodules of concern


Weighted axilla: No adenopathy of concern


Left breast: Postop and radiation changes no discrete dominant masses or nodules

of concern


Left axilla: No adenopathy of concern





Assessment and Plan


Assessment: 





Impression:


1.  Patient status post left breast lumpectomy sentinel node biopsy, radiation 

therapy, chemotherapy, and presently on Arimidex for stage I invasive ductal 

carcinoma no evidence of recurrent cancer at this time


2.  Recent left breast mammogram 929-21 benign BIRADS 2


3.  Fibrocystic breast changes


4.  Family history of breast cancer





Plan:


1.  Continue follow-up with medical oncology


2.  Follow-up here in 4 months


3.  Bilateral mammogram in 6 months





Cc: Dr. Clemons

## 2022-04-22 ENCOUNTER — HOSPITAL ENCOUNTER (OUTPATIENT)
Dept: HOSPITAL 47 - RADUSWWP | Age: 67
End: 2022-04-22
Attending: SURGERY
Payer: MEDICARE

## 2022-04-22 DIAGNOSIS — R92.8: Primary | ICD-10-CM

## 2022-04-22 NOTE — USB
EXAMINATION TYPE: US discontinued breast bx RT

 

DATE OF EXAM: 4/22/2022

 

CLINICAL HISTORY: R92.8,ABN MAMM.

 

TECHNIQUE: Ultrasound real-time linear array sonography is performed over the 12:00 position.

 

COMPARISON: 4/5/2022

 

FINDINGS:

There is a persistent tubular structure which is hypoechoic. No suspicious internal echogenicity is p
resent. Findings appear compatible with a duct. Short-term follow-up can be performed.

 

IMPRESSION: 

1. Probably benign findings, BI-RADS 3.

 

Recommendations:

1. Follow-up right breast ultrasound in 6 months.

2. Patient should continue monthly self breast exam.

## 2022-10-24 ENCOUNTER — HOSPITAL ENCOUNTER (OUTPATIENT)
Dept: HOSPITAL 47 - RADUSWWP | Age: 67
Discharge: HOME | End: 2022-10-24
Attending: SURGERY
Payer: MEDICARE

## 2022-10-24 DIAGNOSIS — Z80.3: ICD-10-CM

## 2022-10-24 DIAGNOSIS — R92.8: Primary | ICD-10-CM

## 2022-10-24 DIAGNOSIS — Z78.0: ICD-10-CM

## 2022-10-24 NOTE — USB
Reason for Exam: Follow-up at short interval from prior study. 





Patient History: 

Menarche at age 13. First Full-Term Pregnancy at age 21. Postmenopausal. Breast cancer, age 64.

06/02/2020, Lumpectomy on the Left side. 6/2/2020, Malignant Core Biopsy on the left side. 4/8/2020,

Malignant Core Biopsy on the left side. 2/15/2016, Benign Cyst Aspiration on the right side.

1/12/2007, Benign Core Biopsy on the left side. 4/22/2022, US discontinued breast bx RT on the right

side.

Maternal aunt had breast cancer. Mother had breast cancer, age 32. 

Technique: 

Method: Targeted.  





Prior Study Comparison: 

3/23/2021 Bilateral Diagnostic Mammogram, [facility]. 9/29/2021 Left Diagnostic Mammogram, [facility]. 4/5/2022

Bilateral Diagnostic Mammogram, [facility]. 4/5/2022 Right Diagnostic Ultrasound, [facility]. 





Findings: 

The axilla of the right breast and the retroareolar of the right breast were scanned.

Area of concern at 12:00 was scanned with grayscale and color Doppler imaging. There remains a

similar somewhat tubular structure measuring 11 x 3 mm which is predominantly anechoic. Findings

favored represent dilated duct. 





Overall Assessment: Negative, BI-RAD 1





Management: 

Screening Mammogram of both breasts in 1 year.

A clinical breast exam by your physician is recommended on an annual basis and results should be

correlated with mammographic findings.  This exam should not preclude additional follow-up of

suspicious palpable abnormalities. ??Results were given to the patient verbally at the time of exam.



Electronically signed and approved by: Damian Vences,

## 2023-04-12 ENCOUNTER — HOSPITAL ENCOUNTER (OUTPATIENT)
Dept: HOSPITAL 47 - RADMAMWWP | Age: 68
Discharge: HOME | End: 2023-04-12
Attending: SURGERY
Payer: MEDICARE

## 2023-04-12 DIAGNOSIS — Z12.31: Primary | ICD-10-CM

## 2023-04-12 DIAGNOSIS — Z98.890: ICD-10-CM

## 2023-04-12 DIAGNOSIS — Z85.3: ICD-10-CM

## 2023-04-12 DIAGNOSIS — Z78.0: ICD-10-CM

## 2023-04-12 DIAGNOSIS — Z80.3: ICD-10-CM

## 2023-04-12 PROCEDURE — 77067 SCR MAMMO BI INCL CAD: CPT

## 2023-04-13 NOTE — MM
Reason for Exam: Screening  (asymptomatic). 

Last screening mammogram was performed 12 month(s) ago.





Patient History: 

Menarche at age 13. First Full-Term Pregnancy at age 21. Postmenopausal. Breast cancer, left, age

64. 06/02/2020, Lumpectomy on the Left side. 6/2/2020, Malignant Core Biopsy on the left side.

4/8/2020, Malignant Core Biopsy on the left side. 2/15/2016, Benign Cyst Aspiration on the right

side. 1/12/2007, Benign Core Biopsy on the left side. 4/22/2022, US discontinued breast bx RT on the

right side.

Maternal aunt had breast cancer. Mother had breast cancer, age 32. 





Prior Study Comparison: 

3/23/2021 Bilateral Diagnostic Mammogram, [facility]. 9/29/2021 Left Diagnostic Mammogram, [facility]. 4/5/2022

Bilateral Diagnostic Mammogram, [facility]. 





Tissue Density: 

The breast tissue is heterogeneously dense. This may lower the sensitivity of mammography.





Findings: 

Analyzed By CAD. 

Pattern appears stable. This is asymmetric with dense parenchymal tissue in the left breast,

unchanged from comparison. Core marker is within the right breast. Surgical clips are within the

left breast.

No suspicious groups of microcalcifications, spiculated or lobular masses, architectural distortion

or other secondary signs of malignancy are mammographically apparent. 





Overall Assessment: Benign, BI-RAD 2





Management: 

Screening Mammogram of both breasts in 1 year.

A negative mammogram report should not preclude additional follow up of suspicious palpable

abnormalities.

Patient should continue monthly self breast exam.

A clinical breast exam by your physician is recommended on an annual basis and results should be

correlated with mammographic findings.



Electronically signed and approved by: Ru Garza D.O. Radiologis

## 2023-04-14 ENCOUNTER — HOSPITAL ENCOUNTER (OUTPATIENT)
Dept: HOSPITAL 47 - WWCWWP | Age: 68
End: 2023-04-14
Attending: SURGERY
Payer: MEDICARE

## 2023-04-14 VITALS
DIASTOLIC BLOOD PRESSURE: 92 MMHG | SYSTOLIC BLOOD PRESSURE: 156 MMHG | TEMPERATURE: 97.9 F | RESPIRATION RATE: 18 BRPM | HEART RATE: 88 BPM

## 2023-04-14 DIAGNOSIS — Z92.3: ICD-10-CM

## 2023-04-14 DIAGNOSIS — C50.912: Primary | ICD-10-CM

## 2023-04-14 DIAGNOSIS — F41.9: ICD-10-CM

## 2023-04-14 DIAGNOSIS — Z80.3: ICD-10-CM

## 2023-04-14 DIAGNOSIS — N60.12: ICD-10-CM

## 2023-04-14 DIAGNOSIS — Z79.811: ICD-10-CM

## 2023-04-14 DIAGNOSIS — Z85.3: ICD-10-CM

## 2023-04-14 DIAGNOSIS — F17.210: ICD-10-CM

## 2023-04-14 DIAGNOSIS — R92.8: ICD-10-CM

## 2023-04-14 NOTE — P.PN
Subjective


Progress Note Date: 23


Principal diagnosis: 





left breast invasive ductal cancer , stage I





stage I left breast cancer invasive ductal cancer [id number]-


Patient is a 65-year-old white female status post left breast lumpectomy and 

sentinel node biopsy on .  Pathology revealed invasive ductal carcinoma 

grade 2 and high-grade DCIS, margins were negative for malignancy.  5 sentinel 

nodes were removed and all were negative for malignancy.  The size of the 

invasive carcinoma was 12 mm, the DCIS was 20 mm. All margins were negative 

although the DCIS was less than 1 mm from the lateral margin.  She has no 

complaints related to the surgery.


She has been seen by medical and radiation oncology.  An Oncotype DX test was  

performed, and 45% .  She had TC x4.    She completed radiation therapy in 

2020.     


     She is taking antihormone therapy and tolerating this without difficulty. 

She is taking arimidex. She had a bilateral mammogram on 23 which was BIRAD

2.








At this time the patient is not complaining of any lumps masses or nodules in 

either breast.  She is not complaining of any nipple discharge or skin changes.


Note from 22 of Dr. Prajapati are reviewed.  


 


 Family history:


Mother: Bilateral breast cancer, cervical cancer, Parkinson's disease, lymphoma


Maternal aunt: Breast cancer





Hormonal history:


Menarche: 13


, 1 miscarriage, first child born at 23, breast fed: No


Menopause: 51


Hormones: Negative





Surgical history:


1.  Tubal ligation


2.  Left breast lumpectomy sentinel node biopsy





Medical history:


anxiety





Social history:


Smoke: 1-1/2 packs per day since 17


Alcohol: Twice a week, 4 beers


Drugs: Negative





Review of systems:


Constitutional: Night sweats


HEENT: Negative


Breasts: As per HPI


Cardiovascular: Negative


Gastric: Smoker/bronchitis


GI: Negative


: Negative


Musculoskeletal: Negative


Integument: Negative


Neurologic: Negative


Psychiatric: Anxiety/deep depression


Endocrine: Negative


Hematologic: Negative


ALLERGIES: Negative























Objective





- Constitutional


General appearance: Present: cooperative





- EENT


Eyes: Present: EOMI


ENT: Present: hearing grossly normal





- Neck


Neck: Present: normal ROM





- Respiratory


Respiratory: bilateral: CTA





- Cardiovascular


Rhythm: regular


Heart sounds: normal: S1, S2





- Gastrointestinal


General gastrointestinal: Present: soft





- Integumentary


Integumentary: Present: normal turgor





- Musculoskeletal


Musculoskeletal: Present: gait normal





- Psychiatric


Psychiatric: Present: A&O x's 3, appropriate affect, intact judgment & insight





- Additional findings


Additional findings: 


Breast Exam:


BRA: 40C


 inspection: Left breast slightly smaller than right breast after treatment


Palpation:


Right breast: Multi-positional exam fibrocystic changes no dominant masses or 

nodules of concern


Right axilla: No adenopathy of concern


Left breast: Postop and radiation changes no discrete dominant masses or nodules

of concern


Left axilla: No adenopathy of concern











Assessment and Plan


Assessment: 


Impression:


1.  Patient status post left breast lumpectomy sentinel node biopsy, radiation 

therapy, chemotherapy, and presently on Arimidex for stage I invasive ductal 

carcinoma no evidence of recurrent cancer at this time


2.  bilateral mammogram 23 BIRAD 2


3.  Fibrocystic breast changes


4.  Family history of breast cancer





Plan:


1.  Continue follow-up with medical oncology; continue arimidex


2.  Follow-up here in 6 months, she has states that she will not follow up in 6 

months.  We will follow up in 1 year


3.  Bilateral mammogram in 1 year





Cc: Dr. Clemons

## 2023-06-08 ENCOUNTER — HOSPITAL ENCOUNTER (OUTPATIENT)
Dept: HOSPITAL 47 - RADBDWWP | Age: 68
Discharge: HOME | End: 2023-06-08
Attending: INTERNAL MEDICINE
Payer: MEDICARE

## 2023-06-08 DIAGNOSIS — Z71.3: ICD-10-CM

## 2023-06-08 DIAGNOSIS — C50.412: Primary | ICD-10-CM

## 2023-06-08 DIAGNOSIS — M85.89: ICD-10-CM

## 2023-06-08 DIAGNOSIS — F41.1: ICD-10-CM

## 2023-06-08 DIAGNOSIS — Z78.0: ICD-10-CM

## 2023-06-08 DIAGNOSIS — G47.00: ICD-10-CM

## 2023-06-08 PROCEDURE — 77080 DXA BONE DENSITY AXIAL: CPT

## 2024-04-15 ENCOUNTER — HOSPITAL ENCOUNTER (OUTPATIENT)
Dept: HOSPITAL 47 - RADMAMWWP | Age: 69
Discharge: HOME | End: 2024-04-15
Attending: SURGERY
Payer: MEDICARE

## 2024-04-15 DIAGNOSIS — Z78.0: ICD-10-CM

## 2024-04-15 DIAGNOSIS — R92.1: ICD-10-CM

## 2024-04-15 DIAGNOSIS — Z80.3: ICD-10-CM

## 2024-04-15 DIAGNOSIS — Z85.3: ICD-10-CM

## 2024-04-15 DIAGNOSIS — R92.333: Primary | ICD-10-CM

## 2024-04-15 PROCEDURE — 77062 BREAST TOMOSYNTHESIS BI: CPT

## 2024-04-15 PROCEDURE — 77066 DX MAMMO INCL CAD BI: CPT

## 2024-04-15 NOTE — MM
Reason for Exam: Hx of breast cancer, conservation therapy. 

Last screening mammogram was performed 12 month(s) ago.





Patient History: 

Menarche at age 13. First Full-Term Pregnancy at age 21. Postmenopausal. Breast cancer, left, age

64. Previous chest radiation therapy at age 64. Previous chemotherapy at age 64. 06/02/2020,

Lumpectomy on the Left side. 6/2/2020, Malignant Core Biopsy on the left side. 4/8/2020, Malignant

Core Biopsy on the left side. 2/15/2016, Benign Cyst Aspiration on the right side. 1/12/2007, Benign

Core Biopsy on the left side. 4/22/2022, US discontinued breast bx RT on the right side.

Maternal aunt had breast cancer. Mother had breast cancer, age 32. 





Prior Study Comparison: 

2/16/2018 Bilateral Screening Mammogram, [facility]. 3/10/2020 Bilateral Screening Mammogram, [facility]. 4/8/2020

Left Diagnostic Mammogram, [facility]. 9/29/2021 Left Diagnostic Mammogram, [facility]. 4/5/2022 Bilateral

Diagnostic Mammogram, [facility]. 4/12/2023 Bilateral MG screening mammo w CAD, [facility]. 





Tissue Density: 

The breasts are heterogeneously dense, which may obscure small masses.





Findings: 

Analyzed By CAD. 

The pattern is stable. There is increased density within the postsurgical left breast compared to

the right. Scattered surgical clips or markers are present. Benign-appearing scattered

calcifications are present.

There is some distortion within the anterior to mid right breast, this was present in 2022.

No suspicious groups of microcalcifications, spiculated or lobular masses, architectural distortion

or other secondary signs of malignancy are mammographically apparent. 





Overall Assessment: Benign, BI-RAD 2





Management: 

Diagnostic Mammogram of both breasts in 1 year.

A negative mammogram report should not preclude additional follow up of suspicious palpable

abnormalities.

Patient should continue monthly self breast exam.

A clinical breast exam by your physician is recommended on an annual basis and results should be

correlated with mammographic findings.



Electronically signed and approved by: Ru Garza D.O. Radiologis

## 2025-04-25 ENCOUNTER — HOSPITAL ENCOUNTER (OUTPATIENT)
Dept: HOSPITAL 47 - RADMAMWWP | Age: 70
Discharge: HOME | End: 2025-04-25
Attending: INTERNAL MEDICINE
Payer: MEDICARE

## 2025-04-25 DIAGNOSIS — G47.00: ICD-10-CM

## 2025-04-25 DIAGNOSIS — F41.1: ICD-10-CM

## 2025-04-25 DIAGNOSIS — Z80.3: ICD-10-CM

## 2025-04-25 DIAGNOSIS — R92.333: ICD-10-CM

## 2025-04-25 DIAGNOSIS — Z71.3: ICD-10-CM

## 2025-04-25 DIAGNOSIS — Z12.31: Primary | ICD-10-CM

## 2025-04-25 DIAGNOSIS — C50.412: ICD-10-CM

## 2025-04-25 DIAGNOSIS — Z78.0: ICD-10-CM

## 2025-04-25 PROCEDURE — 77067 SCR MAMMO BI INCL CAD: CPT
